# Patient Record
(demographics unavailable — no encounter records)

---

## 2024-10-30 NOTE — HISTORY OF PRESENT ILLNESS
[FreeTextEntry1] : 70 M per op hital hernia  - HTN, - DM, + hyperlipidemia, - tobacco On fibrate x 15 years, (hx myalgias from statins) Denies CP, SOB, orthopnea, PND, palpitations or edema. Walks [id number] steps daily, subways stairs, without limitation. Endorses mild fatigue after climbing hills  Recent LPR, hital hernia noted, now preop

## 2024-10-30 NOTE — ASSESSMENT
[FreeTextEntry1] : EKG NSR  A/P 1. pre operative cardiac evaluation 2. hiatal hernia  Hx as above No confirmed ASCVD. Exercise tolerance >> 4 METS. Accordingly. there are no cardiac contraindications to the planned hiatal hernia repair, for which pt presents as low cardiac risk for intermediate risk surgery. In terms of exertional fatigue, his exercise tolerance is objectively wnl, so would proceed w surgery at this time. If symptoms persist (could be attributable to hiatus hernia) will f/u in few months, consider stress echo then to further re evlalute.

## 2024-12-06 NOTE — REASON FOR VISIT
[de-identified] : Diagnostic esophagogastroduodenoscopy.  Robotic-assisted/laparoscopic hiatal hernia repair with biologic mesh and Toupet fundoplication. [de-identified] : 11/13/24 [de-identified] : 3 [de-identified] :   Postop hospitalization uneventful. Patient was discharged home on 11/14/2024.    Surgical pathology:  - gastric fat pad; excision: mature adipose tissue - hernia sac; excision: mature adipose tissue. Two reactive lymph nodes.    Patient presents today with a CXR for the 1st postoperative visit. Patient reports tolerating some soft solid food. He has no reflux but has episodic chest pain.    [Spouse] : spouse

## 2024-12-06 NOTE — REASON FOR VISIT
[de-identified] : Diagnostic esophagogastroduodenoscopy.  Robotic-assisted/laparoscopic hiatal hernia repair with biologic mesh and Toupet fundoplication. [de-identified] : 11/13/24 [de-identified] : 3 [de-identified] :   Postop hospitalization uneventful. Patient was discharged home on 11/14/2024.    Surgical pathology:  - gastric fat pad; excision: mature adipose tissue - hernia sac; excision: mature adipose tissue. Two reactive lymph nodes.    Patient presents today with a CXR for the 1st postoperative visit. Patient reports tolerating some soft solid food. He has no reflux but has episodic chest pain.    [Spouse] : spouse

## 2024-12-06 NOTE — ASSESSMENT
[FreeTextEntry1] : Patient is a 69 y/o male, never smoker with a PMHx of HLD and reflux; PSHx of cholecystectomy. He is referred to the practice by Dr. Ivon Carty for evaluation of a hiatal hernia. He has been experiencing reflux for > 25 years and was diagnosed about 10 years ago.   On 11/13/2024, patient underwent Diagnostic esophagogastroduodenoscopy, Robotic-assisted/laparoscopic hiatal hernia repair with biologic mesh and Toupet fundoplication.  He presents today with a CXR for the 1st postoperative visit. Patient reports tolerating some soft solid food. He has no reflux but has episodic chest pain.  He feels well. His incisions are CDI. His CXR which was reviewed with patient shows postoperative changes. He is tolerating a soft solid diet and was encouraged to advance to a regular diet as long as he chews food well and has small bites. He will stop his pepcid and we will wean his prilosec in 6 weeks when we see him next.  Plan: Regular diet Stop pepcid Followup in 6 weeks  I, Dr. Lance Riggs MD, personally performed the evaluation and management (E/M) services for this established patient who presents today with (a) new problem(s)/exacerbation of (an) existing condition(s).  That E/M includes conducting the clinically appropriate interval history &/or exam, assessing all new/exacerbated conditions, and establishing a new plan of care. I have also independently reviewed the medical records and imaging at the time of this office visit, and discussed the above mentioned images with interpretations with the patient. Today, my KAL, Zenia Hercules, was here to observe &/or participate in the visit & follow plan of care established by me.

## 2024-12-06 NOTE — COUNSELING
[No Heavy Lifting] : no heavy lifting (>15-20 lb. for 1 month or 25 lb. for 3 months from date of surgery) [S/S of infection] : signs and symptoms of infection (and to whom it should be reported) [FreeTextEntry1] : advance diet

## 2024-12-06 NOTE — REASON FOR VISIT
[de-identified] : Diagnostic esophagogastroduodenoscopy.  Robotic-assisted/laparoscopic hiatal hernia repair with biologic mesh and Toupet fundoplication. [de-identified] : 11/13/24 [de-identified] : 3 [de-identified] :   Postop hospitalization uneventful. Patient was discharged home on 11/14/2024.    Surgical pathology:  - gastric fat pad; excision: mature adipose tissue - hernia sac; excision: mature adipose tissue. Two reactive lymph nodes.    Patient presents today with a CXR for the 1st postoperative visit. Patient reports tolerating some soft solid food. He has no reflux but has episodic chest pain.    [Spouse] : spouse

## 2024-12-06 NOTE — ASSESSMENT
[FreeTextEntry1] : Patient is a 71 y/o male, never smoker with a PMHx of HLD and reflux; PSHx of cholecystectomy. He is referred to the practice by Dr. Ivon Carty for evaluation of a hiatal hernia. He has been experiencing reflux for > 25 years and was diagnosed about 10 years ago.   On 11/13/2024, patient underwent Diagnostic esophagogastroduodenoscopy, Robotic-assisted/laparoscopic hiatal hernia repair with biologic mesh and Toupet fundoplication.  He presents today with a CXR for the 1st postoperative visit. Patient reports tolerating some soft solid food. He has no reflux but has episodic chest pain.  He feels well. His incisions are CDI. His CXR which was reviewed with patient shows postoperative changes. He is tolerating a soft solid diet and was encouraged to advance to a regular diet as long as he chews food well and has small bites. He will stop his pepcid and we will wean his prilosec in 6 weeks when we see him next.  Plan: Regular diet Stop pepcid Followup in 6 weeks  I, Dr. Lance Riggs MD, personally performed the evaluation and management (E/M) services for this established patient who presents today with (a) new problem(s)/exacerbation of (an) existing condition(s).  That E/M includes conducting the clinically appropriate interval history &/or exam, assessing all new/exacerbated conditions, and establishing a new plan of care. I have also independently reviewed the medical records and imaging at the time of this office visit, and discussed the above mentioned images with interpretations with the patient. Today, my KAL, Zenia Hercules, was here to observe &/or participate in the visit & follow plan of care established by me.

## 2025-01-16 NOTE — ASSESSMENT
[FreeTextEntry1] : Patient is a 71 y/o male, never smoker with a PMHx of HLD and reflux; PSHx of cholecystectomy. He was referred to the practice by Dr. Ivon Carty for a hiatal hernia. He has been experiencing reflux for > 25 years and was diagnosed about 10 years ago.   On 11/13/2024, patient underwent Diagnostic esophagogastroduodenoscopy, Robotic-assisted/laparoscopic hiatal hernia repair with biologic mesh and Toupet fundoplication.   During initial post-op visit he reported tolerating some soft food and denied reflux. He was advanced to a regular diet and instructed to discontinue Pepcid.  He contacted the practice on 1/15 for complaints of difficulty swallowing, food sticking, and issues when consuming water.  He presents today for a second post-op visit with plans of weaning Prilosec and to review Esophagram.   Xray Esophagram 01/21/25: XX

## 2025-01-16 NOTE — REASON FOR VISIT
[de-identified] : Diagnostic esophagogastroduodenoscopy.  Robotic-assisted/laparoscopic hiatal hernia repair with biologic mesh and Toupet fundoplication. [de-identified] : 11/13/24 [de-identified] : | | Surgical pathology: - gastric fat pad; excision: mature adipose tissue - hernia sac; excision: mature adipose tissue. Two reactive lymph nodes.  Patient presents today for a second post-op visit  [de-identified] : 9

## 2025-01-16 NOTE — REASON FOR VISIT
[de-identified] : Diagnostic esophagogastroduodenoscopy.  Robotic-assisted/laparoscopic hiatal hernia repair with biologic mesh and Toupet fundoplication. [de-identified] : 11/13/24 [de-identified] : | | Surgical pathology: - gastric fat pad; excision: mature adipose tissue - hernia sac; excision: mature adipose tissue. Two reactive lymph nodes.  Patient presents today for a second post-op visit  [de-identified] : 9

## 2025-01-23 NOTE — REASON FOR VISIT
[de-identified] : Diagnostic esophagogastroduodenoscopy.  Robotic-assisted/laparoscopic hiatal hernia repair with biologic mesh and Toupet fundoplication. [de-identified] : 11/13/24 [de-identified] : 9 [de-identified] : | | Surgical pathology: - gastric fat pad; excision: mature adipose tissue - hernia sac; excision: mature adipose tissue. Two reactive lymph nodes.  Patient presents today for a second post-op visit. He c/o dysphagia, an esophagram was obtained on 01/21/2025. Results as below: Unremarkable exam status post hiatal hernia repair.

## 2025-01-23 NOTE — REASON FOR VISIT
[de-identified] : Diagnostic esophagogastroduodenoscopy.  Robotic-assisted/laparoscopic hiatal hernia repair with biologic mesh and Toupet fundoplication. [de-identified] : 11/13/24 [de-identified] : 9 [de-identified] : | | Surgical pathology: - gastric fat pad; excision: mature adipose tissue - hernia sac; excision: mature adipose tissue. Two reactive lymph nodes.  Patient presents today for a second post-op visit. He c/o dysphagia, an esophagram was obtained on 01/21/2025. Results as below: Unremarkable exam status post hiatal hernia repair.

## 2025-01-23 NOTE — DISCUSSION/SUMMARY
[Doing Well] : is doing well [Excellent Pain Control] : has excellent pain control [Clinical Recheck] : clinical recheck

## 2025-01-23 NOTE — ASSESSMENT
[FreeTextEntry1] : Patient is a 71 y/o male, never smoker with a PMHx of HLD and reflux; PSHx of cholecystectomy. He was referred to the practice by Dr. Ivon Carty for a hiatal hernia. He has been experiencing reflux for > 25 years and was diagnosed about 10 years ago.   On 11/13/2024, patient underwent Diagnostic esophagogastroduodenoscopy, Robotic-assisted/laparoscopic hiatal hernia repair with biologic mesh and Toupet fundoplication.   During initial post-op visit he reported tolerating some soft food and denied reflux. He was advanced to a regular diet and instructed to discontinue Pepcid.  He contacted the practice on 1/15 for complaints of intermittent mid-chest pain, difficulty swallowing, food sticking, and issues when consuming water. There's no acid reflux. He presents today for a second post-op visit with an Esophagram.   He feels well and denies reflux. He is on a regular diet. He has occasional dysphagia to bread which resolves with drinking water. His esophagram was reviewed which shows no hernia or reflux. Will followup with us in 1 year with an UGI.  Plan: UGI 1 year  I, Dr. Lance Riggs MD, personally performed the evaluation and management (E/M) services for this established patient who presents today with (a) new problem(s)/exacerbation of (an) existing condition(s).  That E/M includes conducting the clinically appropriate interval history &/or exam, assessing all new/exacerbated conditions, and establishing a new plan of care. I have also independently reviewed the medical records and imaging at the time of this office visit, and discussed the above mentioned images with interpretations with the patient. Today, my KAL Zenia Hercules, was here to observe &/or participate in the visit & follow plan of care established by me.

## 2025-01-23 NOTE — PHYSICAL EXAM
[Auscultation Breath Sounds / Voice Sounds] : lungs were clear to auscultation bilaterally [] : no respiratory distress [Site: ___] : Site: [unfilled] [Clean] : clean [Dry] : dry [Healing Well] : healing well

## 2025-01-23 NOTE — PHYSICAL EXAM
[] : no respiratory distress [Auscultation Breath Sounds / Voice Sounds] : lungs were clear to auscultation bilaterally [Site: ___] : Site: [unfilled] [Clean] : clean [Dry] : dry [Healing Well] : healing well

## 2025-01-23 NOTE — ASSESSMENT
[FreeTextEntry1] : Patient is a 69 y/o male, never smoker with a PMHx of HLD and reflux; PSHx of cholecystectomy. He was referred to the practice by Dr. Ivon Carty for a hiatal hernia. He has been experiencing reflux for > 25 years and was diagnosed about 10 years ago.   On 11/13/2024, patient underwent Diagnostic esophagogastroduodenoscopy, Robotic-assisted/laparoscopic hiatal hernia repair with biologic mesh and Toupet fundoplication.   During initial post-op visit he reported tolerating some soft food and denied reflux. He was advanced to a regular diet and instructed to discontinue Pepcid.  He contacted the practice on 1/15 for complaints of intermittent mid-chest pain, difficulty swallowing, food sticking, and issues when consuming water. There's no acid reflux. He presents today for a second post-op visit with an Esophagram.   He feels well and denies reflux. He is on a regular diet. He has occasional dysphagia to bread which resolves with drinking water. His esophagram was reviewed which shows no hernia or reflux. Will followup with us in 1 year with an UGI.  Plan: UGI 1 year  I, Dr. Lance Riggs MD, personally performed the evaluation and management (E/M) services for this established patient who presents today with (a) new problem(s)/exacerbation of (an) existing condition(s).  That E/M includes conducting the clinically appropriate interval history &/or exam, assessing all new/exacerbated conditions, and establishing a new plan of care. I have also independently reviewed the medical records and imaging at the time of this office visit, and discussed the above mentioned images with interpretations with the patient. Today, my KAL Zenia Hercules, was here to observe &/or participate in the visit & follow plan of care established by me.

## 2025-01-23 NOTE — REASON FOR VISIT
[de-identified] : Diagnostic esophagogastroduodenoscopy.  Robotic-assisted/laparoscopic hiatal hernia repair with biologic mesh and Toupet fundoplication. [de-identified] : 11/13/24 [de-identified] : 9 [de-identified] : | | Surgical pathology: - gastric fat pad; excision: mature adipose tissue - hernia sac; excision: mature adipose tissue. Two reactive lymph nodes.  Patient presents today for a second post-op visit. He c/o dysphagia, an esophagram was obtained on 01/21/2025. Results as below: Unremarkable exam status post hiatal hernia repair.

## 2025-01-23 NOTE — REASON FOR VISIT
[de-identified] : Diagnostic esophagogastroduodenoscopy.  Robotic-assisted/laparoscopic hiatal hernia repair with biologic mesh and Toupet fundoplication. [de-identified] : 11/13/24 [de-identified] : 9 [de-identified] : | | Surgical pathology: - gastric fat pad; excision: mature adipose tissue - hernia sac; excision: mature adipose tissue. Two reactive lymph nodes.  Patient presents today for a second post-op visit. He c/o dysphagia, an esophagram was obtained on 01/21/2025. Results as below: Unremarkable exam status post hiatal hernia repair.

## 2025-04-17 NOTE — PHYSICAL EXAM
[Respiration, Rhythm And Depth] : normal respiratory rhythm and effort [Auscultation Breath Sounds / Voice Sounds] : lungs were clear to auscultation bilaterally [Heart Rate And Rhythm] : heart rate was normal and rhythm regular [Heart Sounds] : normal S1 and S2 [Abnormal Walk] : normal gait [Nail Clubbing] : no clubbing  or cyanosis of the fingernails [Skin Color & Pigmentation] : normal skin color and pigmentation [] : no rash [Sensation] : the sensory exam was normal to light touch and pinprick [Motor Exam] : the motor exam was normal [Oriented To Time, Place, And Person] : oriented to person, place, and time [Affect] : the affect was normal [Mood] : the mood was normal

## 2025-04-17 NOTE — HISTORY OF PRESENT ILLNESS
[FreeTextEntry1] : Patient is a 71 y/o male, never smoker with a PMHx of HLD and reflux; PSHx of cholecystectomy. He was referred to the practice by Dr. Ivon Carty for a hiatal hernia.   On 11/13/2024, patient underwent Diagnostic esophagogastroduodenoscopy, Robotic-assisted/laparoscopic hiatal hernia repair with biologic mesh and Toupet fundoplication.  During initial post-op visit he reported tolerating some soft food and denied reflux. He was advanced to a regular diet and instructed to discontinue Pepcid.  He contacted the practice on 1/15 for complaints of intermittent mid-chest pain, difficulty swallowing, food sticking, and issues when consuming water. Esophagram was obtained on 01/21/2025 and showed normal motility and no reflux.   He has since followed up with a new GI doctor in [facility]- Dr. Mary Alice Lazo. He presents today for a follow-up visit to discuss symptoms. He reports issues with food sticking mainly chapati that resolves when drinking water.

## 2025-04-17 NOTE — ASSESSMENT
[FreeTextEntry1] : 71 y/o male, never smoker with a PMHx of HLD and reflux; PSHx of cholecystectomy. He was referred to the practice by Dr. Ivon Carty for a hiatal hernia.  On 11/13/2024, patient underwent Diagnostic esophagogastroduodenoscopy, Robotic-assisted/laparoscopic hiatal hernia repair with biologic mesh and Toupet fundoplication.  During initial post-op visit he reported tolerating some soft food and denied reflux. He was advanced to a regular diet and instructed to discontinue Pepcid.  He contacted the practice on 1/15 for complaints of intermittent mid-chest pain, difficulty swallowing, food sticking, and issues when consuming water. Esophagram was obtained on 01/21/2025 and showed normal motility and no reflux.  He has since followed up with his GI doctor at [facility]. He presents today for a follow-up visit to discuss symptoms.  His dysphagia is related to eating breads which resolves when he drinks water. His dysphagia does not occur when he has small bites and drinks liquids with the bites as he was advised to do. He has no reflux and his last UGI did not shows any abnormalities. We advised him to follow up with us in 1 year with an UGI.  Plan: UGI 1 year (November 2025)  I, Dr. Lance Riggs MD, personally performed the evaluation and management (E/M) services for this established patient who presents today with (a) new problem(s)/exacerbation of (an) existing condition(s).  That E/M includes conducting the clinically appropriate interval history &/or exam, assessing all new/exacerbated conditions, and establishing a new plan of care. I have also independently reviewed the medical records and imaging at the time of this office visit, and discussed the above mentioned images with interpretations with the patient. Today, my KAL was here to observe &/or participate in the visit & follow plan of care established by me.

## 2025-04-17 NOTE — ASSESSMENT
[FreeTextEntry1] : 71 y/o male, never smoker with a PMHx of HLD and reflux; PSHx of cholecystectomy. He was referred to the practice by Dr. Ivon Carty for a hiatal hernia.  On 11/13/2024, patient underwent Diagnostic esophagogastroduodenoscopy, Robotic-assisted/laparoscopic hiatal hernia repair with biologic mesh and Toupet fundoplication.  During initial post-op visit he reported tolerating some soft food and denied reflux. He was advanced to a regular diet and instructed to discontinue Pepcid.  He contacted the practice on 1/15 for complaints of intermittent mid-chest pain, difficulty swallowing, food sticking, and issues when consuming water. Esophagram was obtained on 01/21/2025 and showed normal motility and no reflux.  He has since followed up with his GI doctor at [facility]. He presents today for a follow-up visit to discuss symptoms.  His dysphagia is related to eating breads which resolves when he drinks water. His dysphagia does not occur when he has small bites and drinks liquids with the bites as he was advised to do. He has no reflux and his last UGI did not shows any abnormalities. We advised him to follow up with us in 1 year with an UGI.  Plan: UGI 1 year (November 2025)  I, Dr. Lance Riggs MD, personally performed the evaluation and management (E/M) services for this established patient who presents today with (a) new problem(s)/exacerbation of (an) existing condition(s).  That E/M includes conducting the clinically appropriate interval history &/or exam, assessing all new/exacerbated conditions, and establishing a new plan of care. I have also independently reviewed the medical records and imaging at the time of this office visit, and discussed the above mentioned images with interpretations with the patient. Today, my KAL was here to observe &/or participate in the visit & follow plan of care established by me.

## 2025-04-17 NOTE — HISTORY OF PRESENT ILLNESS
[FreeTextEntry1] : Patient is a 69 y/o male, never smoker with a PMHx of HLD and reflux; PSHx of cholecystectomy. He was referred to the practice by Dr. Ivon Carty for a hiatal hernia.   On 11/13/2024, patient underwent Diagnostic esophagogastroduodenoscopy, Robotic-assisted/laparoscopic hiatal hernia repair with biologic mesh and Toupet fundoplication.  During initial post-op visit he reported tolerating some soft food and denied reflux. He was advanced to a regular diet and instructed to discontinue Pepcid.  He contacted the practice on 1/15 for complaints of intermittent mid-chest pain, difficulty swallowing, food sticking, and issues when consuming water. Esophagram was obtained on 01/21/2025 and showed normal motility and no reflux.   He has since followed up with a new GI doctor in [facility]- Dr. Mary Alice Lazo. He presents today for a follow-up visit to discuss symptoms. He reports issues with food sticking mainly chapati that resolves when drinking water.